# Patient Record
Sex: MALE
[De-identification: names, ages, dates, MRNs, and addresses within clinical notes are randomized per-mention and may not be internally consistent; named-entity substitution may affect disease eponyms.]

---

## 2020-03-21 ENCOUNTER — HOSPITAL ENCOUNTER (EMERGENCY)
Dept: HOSPITAL 53 - M ED | Age: 25
Discharge: HOME | End: 2020-03-21
Payer: COMMERCIAL

## 2020-03-21 VITALS — SYSTOLIC BLOOD PRESSURE: 134 MMHG | DIASTOLIC BLOOD PRESSURE: 57 MMHG

## 2020-03-21 VITALS — WEIGHT: 195.11 LBS | HEIGHT: 70 IN | BODY MASS INDEX: 27.93 KG/M2

## 2020-03-21 DIAGNOSIS — Z20.89: ICD-10-CM

## 2020-03-21 DIAGNOSIS — J45.909: ICD-10-CM

## 2020-03-21 DIAGNOSIS — M94.262: Primary | ICD-10-CM

## 2020-03-21 DIAGNOSIS — J06.9: ICD-10-CM

## 2020-03-21 PROCEDURE — 87486 CHLMYD PNEUM DNA AMP PROBE: CPT

## 2020-03-21 PROCEDURE — 87633 RESP VIRUS 12-25 TARGETS: CPT

## 2020-03-21 PROCEDURE — 87880 STREP A ASSAY W/OPTIC: CPT

## 2020-03-21 PROCEDURE — 73564 X-RAY EXAM KNEE 4 OR MORE: CPT

## 2020-03-21 PROCEDURE — 71045 X-RAY EXAM CHEST 1 VIEW: CPT

## 2020-03-21 PROCEDURE — 87581 M.PNEUMON DNA AMP PROBE: CPT

## 2020-03-21 PROCEDURE — 87798 DETECT AGENT NOS DNA AMP: CPT

## 2020-03-21 PROCEDURE — 99284 EMERGENCY DEPT VISIT MOD MDM: CPT

## 2020-03-21 NOTE — REP
Clinical:  Left knee pain

 

Technique:  AP, lateral, bilateral oblique and sunrise views left knee .

 

Findings:  The osseous structures and joint spaces are intact and normal.  There

is no evidence for acute fracture or dislocation.  No joint effusion is

appreciated.  Surrounding soft tissues are unremarkable.  No subcutaneous

emphysema or radiodense foreign body.

 

Impression:

Normal examination.  No acute fracture or dislocation.

 

 

Electronically Signed by

Edwin Madrigal MD 03/21/2020 12:05 P

## 2020-03-21 NOTE — REP
Clinical:  Coughing and wheezing .

 

Comparison:  None .

 

Findings:

The mediastinum and cardiac silhouette are stable and within normal limits for

portable technique.  The lung fields are clear without acute consolidation,

effusion, or pneumothorax.  Skeletal structures are intact.

 

Impression:

No focal consolidation or effusion.

 

 

Electronically Signed by

Edwin Madrigal MD 03/21/2020 12:20 P